# Patient Record
(demographics unavailable — no encounter records)

---

## 2025-05-08 NOTE — IMAGING
[de-identified] : Right elbow: Positive tenderness over the medial epicondyle, full range of motion, 5 out of 5 strength, neurovascular intact, negative Tinel's.  X-ray right elbow 3 views: No fracture bony abnormalities noted.

## 2025-05-08 NOTE — HISTORY OF PRESENT ILLNESS
[de-identified] : Chemo is a 11 year old RHD male who presents to the walk in accompanied by his father for evaluation of right elbow pain that began approximately 2 weeks ago and reoccurred yesterday.  Patient's father states he was pitching and pitched 34 pitches in 1 inning.  After that he states he felt a lot of pain in the medial aspect of the elbow.  He rested for about 10 days until about yesterday when he was throwing a ball at recess and felt pain again in the elbow.  He denies any loss of motion or swelling and states the pain only occurs when he throws.  He did take naproxen but did not notice any improvement.  He denies any paresthesias.

## 2025-05-08 NOTE — ASSESSMENT
[FreeTextEntry1] : 11-year-old male with right elbow medial epicondylitis.  Patient will rest and use anti-inflammatories as needed.  He will follow-up in 3 to 4 weeks for reassessment and is aware if there is any issue he can contact the office and he verbalized understanding and agreement.

## 2025-06-02 NOTE — PHYSICAL EXAM
[Not Examined] : not examined [Normal] : The patient is moving all extremities spontaneously without any gross neurologic deficits. They walk with a fluid nonantalgic gait. There are equal and symmetric deep tendon reflexes in the upper and lower extremities bilaterally. There is gross intact sensation to soft and light touch in the bilateral upper and lower extremities [de-identified] : no ttp

## 2025-06-02 NOTE — PHYSICAL EXAM
[Not Examined] : not examined [Normal] : The patient is moving all extremities spontaneously without any gross neurologic deficits. They walk with a fluid nonantalgic gait. There are equal and symmetric deep tendon reflexes in the upper and lower extremities bilaterally. There is gross intact sensation to soft and light touch in the bilateral upper and lower extremities [de-identified] : no ttp

## 2025-06-02 NOTE — HISTORY OF PRESENT ILLNESS
[FreeTextEntry1] : 10 y/o male here with right elbow medial epicondylitis after pitching about 5 weeks ago and 3 weeks ago. Patient was last seen by OTONIEL Keane on 5/8/25 and was advised to rest and use anti-inflammatories. Patient has been stretching before and after pitching, has been slowly building up intensity of pitching over the past week.

## 2025-06-02 NOTE — ASSESSMENT
[FreeTextEntry1] : Continue slowly building up the intensity of pitching as well as stretching before and after. Icing can help. Can return 100% in 1 week. Follow up PRN.

## 2025-06-02 NOTE — HISTORY OF PRESENT ILLNESS
[FreeTextEntry1] : 12 y/o male here with right elbow medial epicondylitis after pitching about 5 weeks ago and 3 weeks ago. Patient was last seen by OTONIEL Keane on 5/8/25 and was advised to rest and use anti-inflammatories. Patient has been stretching before and after pitching, has been slowly building up intensity of pitching over the past week.

## 2025-07-29 NOTE — ASSESSMENT
[FreeTextEntry1] : Discussed that due to the discoid meniscus, there is an increased risk of injury. Also discussed potential saucerization down the line if PT does not help. Discussed details of the procedure as well as the recovery process. Patient is to go to physical therapy for both knees, learn exercises, and do them at home. Should be ok to return to sports in 4 weeks. Patient will follow up in 1-2 months.

## 2025-07-29 NOTE — HISTORY OF PRESENT ILLNESS
[FreeTextEntry1] : 10 y/o male  here with right knee pain starting 7/3/25. Pain with going up the stairs and swinging the baseball bat with twisting motions, and crouching. Patient also has had pain of the left knee in the past with swinging the baseball bat. Here today after MRI of the right knee which shows no tears but a discoid meniscus.

## 2025-07-29 NOTE — HISTORY OF PRESENT ILLNESS
[FreeTextEntry1] : 12 y/o male  here with right knee pain starting 7/3/25. Pain with going up the stairs and swinging the baseball bat with twisting motions, and crouching. Patient also has had pain of the left knee in the past with swinging the baseball bat. Here today after MRI of the right knee which shows no tears but a discoid meniscus.

## 2025-07-30 NOTE — HISTORY OF PRESENT ILLNESS
[FreeTextEntry1] : 10 y/o male  here with right knee pain starting 7/3/25. Pain with going up the stairs and swinging the baseball bat with twisting motions.

## 2025-07-30 NOTE — PHYSICAL EXAM
[Not Examined] : not examined [Normal] : The patient is moving all extremities spontaneously without any gross neurologic deficits. They walk with a fluid nonantalgic gait. There are equal and symmetric deep tendon reflexes in the upper and lower extremities bilaterally. There is gross intact sensation to soft and light touch in the bilateral upper and lower extremities [de-identified] : pain at the joint line right over the meniscus

## 2025-07-30 NOTE — HISTORY OF PRESENT ILLNESS
[FreeTextEntry1] : 12 y/o male  here with right knee pain starting 7/3/25. Pain with going up the stairs and swinging the baseball bat with twisting motions.

## 2025-07-30 NOTE — PHYSICAL EXAM
[Not Examined] : not examined [Normal] : The patient is moving all extremities spontaneously without any gross neurologic deficits. They walk with a fluid nonantalgic gait. There are equal and symmetric deep tendon reflexes in the upper and lower extremities bilaterally. There is gross intact sensation to soft and light touch in the bilateral upper and lower extremities [de-identified] : pain at the joint line right over the meniscus